# Patient Record
(demographics unavailable — no encounter records)

---

## 2019-06-17 NOTE — NUR
FIRST CONTACT WITH PT. PT C/O LEFT UPPER & LOWER TEETH PAIN X2 DAYS, HX LUPUS & 
SJOGRENS. PT'S AOX4. RESPS EVEN AND UNLABORED.

## 2019-06-18 NOTE — NUR
Patient given discharge instructions and Rx, they have confirmed that they 
understand the instructions.  Patient ambulatory with steady gait.

## 2019-06-21 NOTE — NUR
PATIENT PRESENTS TO ED TODAY FOR SWELLING/NUMBNESS TO [facility] STARTING ON MONDAY, 
SEEN IN ED LAST MON, TAKING PO ABX SINCE, SENT BY DENTIST FOR IV ABX. FAMILY AT 
BEDSIDE, JUSTIN. AWAITING ORDERS, CALL LIGHT WITHIN REACH.